# Patient Record
Sex: FEMALE | Race: WHITE | URBAN - METROPOLITAN AREA
[De-identification: names, ages, dates, MRNs, and addresses within clinical notes are randomized per-mention and may not be internally consistent; named-entity substitution may affect disease eponyms.]

---

## 2018-08-10 ENCOUNTER — EMERGENCY (EMERGENCY)
Facility: HOSPITAL | Age: 43
LOS: 0 days | Discharge: HOME | End: 2018-08-10
Attending: STUDENT IN AN ORGANIZED HEALTH CARE EDUCATION/TRAINING PROGRAM | Admitting: STUDENT IN AN ORGANIZED HEALTH CARE EDUCATION/TRAINING PROGRAM

## 2018-08-10 VITALS
RESPIRATION RATE: 18 BRPM | HEART RATE: 87 BPM | DIASTOLIC BLOOD PRESSURE: 58 MMHG | OXYGEN SATURATION: 100 % | TEMPERATURE: 98 F | SYSTOLIC BLOOD PRESSURE: 104 MMHG

## 2018-08-10 VITALS — HEIGHT: 67 IN | WEIGHT: 259.04 LBS

## 2018-08-10 DIAGNOSIS — R79.9 ABNORMAL FINDING OF BLOOD CHEMISTRY, UNSPECIFIED: ICD-10-CM

## 2018-08-10 DIAGNOSIS — E03.9 HYPOTHYROIDISM, UNSPECIFIED: ICD-10-CM

## 2018-08-10 DIAGNOSIS — D64.9 ANEMIA, UNSPECIFIED: ICD-10-CM

## 2018-08-10 DIAGNOSIS — J45.909 UNSPECIFIED ASTHMA, UNCOMPLICATED: ICD-10-CM

## 2018-08-10 DIAGNOSIS — F17.200 NICOTINE DEPENDENCE, UNSPECIFIED, UNCOMPLICATED: ICD-10-CM

## 2018-08-10 DIAGNOSIS — R07.9 CHEST PAIN, UNSPECIFIED: ICD-10-CM

## 2018-08-10 LAB
ALBUMIN SERPL ELPH-MCNC: 3.9 G/DL — SIGNIFICANT CHANGE UP (ref 3.5–5.2)
ALP SERPL-CCNC: 54 U/L — SIGNIFICANT CHANGE UP (ref 30–115)
ALT FLD-CCNC: 10 U/L — SIGNIFICANT CHANGE UP (ref 0–41)
ANION GAP SERPL CALC-SCNC: 11 MMOL/L — SIGNIFICANT CHANGE UP (ref 7–14)
APTT BLD: 33.1 SEC — SIGNIFICANT CHANGE UP (ref 27–39.2)
AST SERPL-CCNC: 12 U/L — SIGNIFICANT CHANGE UP (ref 0–41)
BASOPHILS # BLD AUTO: 0.02 K/UL — SIGNIFICANT CHANGE UP (ref 0–0.2)
BASOPHILS NFR BLD AUTO: 0.6 % — SIGNIFICANT CHANGE UP (ref 0–1)
BILIRUB SERPL-MCNC: <0.2 MG/DL — SIGNIFICANT CHANGE UP (ref 0.2–1.2)
BUN SERPL-MCNC: 16 MG/DL — SIGNIFICANT CHANGE UP (ref 10–20)
CALCIUM SERPL-MCNC: 8.8 MG/DL — SIGNIFICANT CHANGE UP (ref 8.5–10.1)
CHLORIDE SERPL-SCNC: 106 MMOL/L — SIGNIFICANT CHANGE UP (ref 98–110)
CK SERPL-CCNC: 48 U/L — SIGNIFICANT CHANGE UP (ref 0–225)
CO2 SERPL-SCNC: 23 MMOL/L — SIGNIFICANT CHANGE UP (ref 17–32)
CREAT SERPL-MCNC: 0.6 MG/DL — LOW (ref 0.7–1.5)
D DIMER BLD IA.RAPID-MCNC: 112 NG/ML DDU — SIGNIFICANT CHANGE UP (ref 0–230)
EOSINOPHIL # BLD AUTO: 0.08 K/UL — SIGNIFICANT CHANGE UP (ref 0–0.7)
EOSINOPHIL NFR BLD AUTO: 2.2 % — SIGNIFICANT CHANGE UP (ref 0–8)
GLUCOSE SERPL-MCNC: 92 MG/DL — SIGNIFICANT CHANGE UP (ref 70–99)
HCT VFR BLD CALC: 25.6 % — LOW (ref 37–47)
HGB BLD-MCNC: 7.2 G/DL — CRITICAL LOW (ref 12–16)
IMM GRANULOCYTES NFR BLD AUTO: 0.6 % — HIGH (ref 0.1–0.3)
INR BLD: 1.04 RATIO — SIGNIFICANT CHANGE UP (ref 0.65–1.3)
LYMPHOCYTES # BLD AUTO: 0.74 K/UL — LOW (ref 1.2–3.4)
LYMPHOCYTES # BLD AUTO: 20.6 % — SIGNIFICANT CHANGE UP (ref 20.5–51.1)
MCHC RBC-ENTMCNC: 19 PG — LOW (ref 27–31)
MCHC RBC-ENTMCNC: 28.1 G/DL — LOW (ref 32–37)
MCV RBC AUTO: 67.5 FL — LOW (ref 81–99)
MONOCYTES # BLD AUTO: 0.2 K/UL — SIGNIFICANT CHANGE UP (ref 0.1–0.6)
MONOCYTES NFR BLD AUTO: 5.6 % — SIGNIFICANT CHANGE UP (ref 1.7–9.3)
NEUTROPHILS # BLD AUTO: 2.54 K/UL — SIGNIFICANT CHANGE UP (ref 1.4–6.5)
NEUTROPHILS NFR BLD AUTO: 70.4 % — SIGNIFICANT CHANGE UP (ref 42.2–75.2)
NRBC # BLD: 0 /100 WBCS — SIGNIFICANT CHANGE UP (ref 0–0)
PLATELET # BLD AUTO: 214 K/UL — SIGNIFICANT CHANGE UP (ref 130–400)
POTASSIUM SERPL-MCNC: 4.7 MMOL/L — SIGNIFICANT CHANGE UP (ref 3.5–5)
POTASSIUM SERPL-SCNC: 4.7 MMOL/L — SIGNIFICANT CHANGE UP (ref 3.5–5)
PROT SERPL-MCNC: 6.3 G/DL — SIGNIFICANT CHANGE UP (ref 6–8)
PROTHROM AB SERPL-ACNC: 11.2 SEC — SIGNIFICANT CHANGE UP (ref 9.95–12.87)
RBC # BLD: 3.79 M/UL — LOW (ref 4.2–5.4)
RBC # FLD: 18.1 % — HIGH (ref 11.5–14.5)
SODIUM SERPL-SCNC: 140 MMOL/L — SIGNIFICANT CHANGE UP (ref 135–146)
TROPONIN T SERPL-MCNC: <0.01 NG/ML — SIGNIFICANT CHANGE UP
TROPONIN T SERPL-MCNC: <0.01 NG/ML — SIGNIFICANT CHANGE UP
WBC # BLD: 3.6 K/UL — LOW (ref 4.8–10.8)
WBC # FLD AUTO: 3.6 K/UL — LOW (ref 4.8–10.8)

## 2018-08-10 RX ORDER — SODIUM CHLORIDE 9 MG/ML
3 INJECTION INTRAMUSCULAR; INTRAVENOUS; SUBCUTANEOUS ONCE
Qty: 0 | Refills: 0 | Status: COMPLETED | OUTPATIENT
Start: 2018-08-10 | End: 2018-08-10

## 2018-08-10 RX ADMIN — SODIUM CHLORIDE 3 MILLILITER(S): 9 INJECTION INTRAMUSCULAR; INTRAVENOUS; SUBCUTANEOUS at 09:44

## 2018-08-10 NOTE — ED PROVIDER NOTE - NS ED ROS FT
Constitutional:  See HPI  Eyes:  No visual changes  ENMT: No neck pain or stiffness  Cardiac:  See HPI  Respiratory:  No cough or respiratory distress.   GI:  No nausea, vomiting, diarrhea or abdominal pain; no bloody or black stool  :  No dysuria, frequency or burning or hematuria  MS:  No back pain.  Neuro:  No headache or dizziness  Skin:  No skin rash  Except as documented in the HPI,  all other systems are negative

## 2018-08-10 NOTE — ED PROVIDER NOTE - PHYSICAL EXAMINATION
CONSTITUTIONAL: NAD  SKIN: Warm dry  HEAD: NCAT  EYES: NL inspection  ENT: MMM  NECK: Supple; non tender.  CARD: RRR  RESP: CTAB  ABD: S/NT no R/G  EXT: no pedal edema  NEURO: Grossly unremarkable  PSYCH: Cooperative, appropriate.

## 2018-08-10 NOTE — ED PROVIDER NOTE - MEDICAL DECISION MAKING DETAILS
IMP: pe vs acs vs MSK vs anemia.  Pt overall low risk for cardiovasc disease.  P: labs, ekg, cxr, reassess.

## 2018-08-10 NOTE — ED ADULT NURSE NOTE - NSIMPLEMENTINTERV_GEN_ALL_ED
Implemented All Universal Safety Interventions:  [place] to call system. Call bell, personal items and telephone within reach. Instruct patient to call for assistance. Room bathroom lighting operational. Non-slip footwear when patient is off stretcher. Physically safe environment: no spills, clutter or unnecessary equipment. Stretcher in lowest position, wheels locked, appropriate side rails in place.

## 2018-08-10 NOTE — ED PROVIDER NOTE - OBJECTIVE STATEMENT
41 y/o F pmh asthma, thyroid d/o, smoker, p/w CP.  Pt has had similar intermittent pain x 1 yr.  yesterday, worse, and lasted all day, still now.  [place] [facility] non [place]chemci ekg and NL cxr, labs w/ low H/H.  called back this AM by them for elevated ddimer.  No sob, travel, leg swelling, OCP.  NO ablnl bleeding.  No exertional sx. NO hx VTE or sig FHX.  No prior stress/ cardio eval.  pain now mild, non radiating.  + smoker

## 2018-08-10 NOTE — ED ADULT NURSE NOTE - OBJECTIVE STATEMENT
Patient states she has been having chest pain since yesterday, went to urgent care where they micheal blood, states she received call from urgent care stating that her d-dimer was elevated and hemoglobin was low. Per patient, she has history of anemia, was sent to hospital for CT of chest to r/o PE.

## 2018-09-29 NOTE — ED PROVIDER NOTE - NS ED MD DISPO DISCHARGE CCDA
Patient/Caregiver provided printed discharge information. Pt rec'd in 28, Ukrainian-speaking with son translating per request. Per son, pt went walking in the park yesterday, was coming out of the car and felt dizzy and nauseated. Pt felt fine last night and went to sleep, woke up this morning with 3 episode of vomiting, dizziness, left sided headache, and chest pressure. Denies abd pain. Per son, pt took Zantac last night and has "lots of gas". Pt came from [facility] 3 weeks ago and was seen here 1 week ago for SOB.

## 2020-01-01 ENCOUNTER — EMERGENCY (EMERGENCY)
Facility: HOSPITAL | Age: 45
LOS: 0 days | Discharge: HOME | End: 2020-01-01
Attending: EMERGENCY MEDICINE | Admitting: EMERGENCY MEDICINE
Payer: COMMERCIAL

## 2020-01-01 VITALS
RESPIRATION RATE: 18 BRPM | SYSTOLIC BLOOD PRESSURE: 126 MMHG | HEIGHT: 67 IN | OXYGEN SATURATION: 100 % | TEMPERATURE: 98 F | DIASTOLIC BLOOD PRESSURE: 65 MMHG | WEIGHT: 225.97 LBS | HEART RATE: 62 BPM

## 2020-01-01 DIAGNOSIS — S60.221A CONTUSION OF RIGHT HAND, INITIAL ENCOUNTER: ICD-10-CM
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
## 2020-01-01 DIAGNOSIS — S62.612A DISPLACED FRACTURE OF PROXIMAL PHALANX OF RIGHT MIDDLE FINGER, INITIAL ENCOUNTER FOR CLOSED FRACTURE: ICD-10-CM
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
## 2020-01-01 PROCEDURE — 73130 X-RAY EXAM OF HAND: CPT | Mod: 26,RT

## 2020-01-01 PROCEDURE — 26720 TREAT FINGER FRACTURE EACH: CPT | Mod: 54

## 2020-01-01 PROCEDURE — 99284 EMERGENCY DEPT VISIT MOD MDM: CPT | Mod: 57

## 2020-01-01 NOTE — ED PROVIDER NOTE - NSFOLLOWUPINSTRUCTIONS_ED_ALL_ED_FT
right 3rd proximal phalanx  fracture    A fracture is a break in one of your bones. This can occur from a variety of injuries especially traumatic ones. Symptoms include pain, bruising, or swelling.  A splint might have been applied by your health care provider. Make sure to keep it dry and follow up with an orthopedist as instructed.    SEEK IMMEDIATE MEDICAL CARE IF YOU HAVE THE FOLLOWING SYMPTOMS: numbness, tingling, pain, or weakness in any part of your body distal to the fracture.

## 2020-01-01 NOTE — ED PROVIDER NOTE - CARE PROVIDER_API CALL
Eddi Dutta)  Orthopaedic Surgery  [address] [place] [address]  Phone: [phone]  Fax: [phone]  Follow Up Time:

## 2020-01-01 NOTE — ED PROVIDER NOTE - NS ED ROS FT
Constitutional: no fever, chills, no recent weight loss, change in appetite or malaise  MS: see HPI  Neuro: No headache or weakness. No LOC.  Skin: + bruising  Endocrine: No history of thyroid disease or diabetes.

## 2020-01-01 NOTE — ED PROVIDER NOTE - PHYSICAL EXAMINATION
CONSTITUTIONAL: Well-appearing; well-nourished; in no apparent distress.   MS: + ttp to right 3th MCP with painful ROM. non-tender to right 2nd/4th MCP  joints and fingers.   SKIN: + ecchymosis/swelling and ttp over the right 2nd to 4th MCP joint dorsal aspect.   NEURO/PSYCH: A & O x 4; grossly unremarkable.

## 2020-01-01 NOTE — ED PROVIDER NOTE - CLINICAL SUMMARY MEDICAL DECISION MAKING FREE TEXT BOX
44yF p/w R hand injury - xray concerning for subtle fx of 3rd prox phalanx near MCP, minimally displaced - pt splinted.  Ok to dc with splint care instructions, ortho referral, return precautions.

## 2020-01-01 NOTE — ED PROVIDER NOTE - OBJECTIVE STATEMENT
45 yo female no sig hx present c/o right hand pain/bruising and swelling s/p a door slummed on her hand a day ago. pain worsen with movement and palpation. pain mostly to the third finger. denies numbness and weakness to right hand. denies other injury.

## 2020-01-01 NOTE — ED PROVIDER NOTE - PATIENT PORTAL LINK FT
You can access the FollowMyHealth Patient Portal offered by [facility] by registering at the following website: [email or web address]. By joining [employer]’s FollowMyHealth portal, you will also be able to view your health information using other applications (apps) compatible with our system.

## 2020-01-01 NOTE — ED PROVIDER NOTE - ATTENDING CONTRIBUTION TO CARE
44yF p/w R hand pain - accidentally had door slammed on her R hand - c/o pain, bruising to R hand.  Not on blood thinners.    exam w/ tenderness to dorsum of R hand just prox to 2nd/3rd MCP joints, +ecchymosis and mild swelling

## 2020-01-02 PROBLEM — E03.9 HYPOTHYROIDISM, UNSPECIFIED: Chronic | Status: ACTIVE | Noted: 2018-08-10

## 2021-10-10 ENCOUNTER — EMERGENCY (EMERGENCY)
Facility: HOSPITAL | Age: 46
LOS: 0 days | Discharge: HOME | End: 2021-10-11
Attending: STUDENT IN AN ORGANIZED HEALTH CARE EDUCATION/TRAINING PROGRAM | Admitting: STUDENT IN AN ORGANIZED HEALTH CARE EDUCATION/TRAINING PROGRAM
Payer: COMMERCIAL

## 2021-10-10 VITALS
DIASTOLIC BLOOD PRESSURE: 80 MMHG | RESPIRATION RATE: 18 BRPM | HEART RATE: 116 BPM | WEIGHT: 212.08 LBS | SYSTOLIC BLOOD PRESSURE: 135 MMHG | OXYGEN SATURATION: 99 % | TEMPERATURE: 98 F | HEIGHT: 67 IN

## 2021-10-10 DIAGNOSIS — R06.02 SHORTNESS OF BREATH: ICD-10-CM

## 2021-10-10 DIAGNOSIS — Z20.822 CONTACT WITH AND (SUSPECTED) EXPOSURE TO COVID-19: ICD-10-CM

## 2021-10-10 DIAGNOSIS — Z87.891 PERSONAL HISTORY OF NICOTINE DEPENDENCE: ICD-10-CM

## 2021-10-10 PROCEDURE — 99285 EMERGENCY DEPT VISIT HI MDM: CPT

## 2021-10-10 NOTE — ED ADULT TRIAGE NOTE - CHIEF COMPLAINT QUOTE
Patient complaining of shortness of breath [id number] and worsening. Patient reports mild lightheadedness.

## 2021-10-11 VITALS
RESPIRATION RATE: 17 BRPM | DIASTOLIC BLOOD PRESSURE: 60 MMHG | TEMPERATURE: 97 F | SYSTOLIC BLOOD PRESSURE: 109 MMHG | OXYGEN SATURATION: 100 % | HEART RATE: 95 BPM

## 2021-10-11 LAB
ALBUMIN SERPL ELPH-MCNC: 4.2 G/DL — SIGNIFICANT CHANGE UP (ref 3.5–5.2)
ALP SERPL-CCNC: 68 U/L — SIGNIFICANT CHANGE UP (ref 30–115)
ALT FLD-CCNC: 19 U/L — SIGNIFICANT CHANGE UP (ref 0–41)
ANION GAP SERPL CALC-SCNC: 16 MMOL/L — HIGH (ref 7–14)
APTT BLD: 35.1 SEC — SIGNIFICANT CHANGE UP (ref 27–39.2)
AST SERPL-CCNC: 14 U/L — SIGNIFICANT CHANGE UP (ref 0–41)
BASE EXCESS BLDV CALC-SCNC: -6.1 MMOL/L — LOW (ref -2–3)
BASOPHILS # BLD AUTO: 0.02 K/UL — SIGNIFICANT CHANGE UP (ref 0–0.2)
BASOPHILS NFR BLD AUTO: 0.4 % — SIGNIFICANT CHANGE UP (ref 0–1)
BILIRUB SERPL-MCNC: <0.2 MG/DL — SIGNIFICANT CHANGE UP (ref 0.2–1.2)
BUN SERPL-MCNC: 11 MG/DL — SIGNIFICANT CHANGE UP (ref 10–20)
CA-I SERPL-SCNC: 1.18 MMOL/L — SIGNIFICANT CHANGE UP (ref 1.15–1.33)
CALCIUM SERPL-MCNC: 8.8 MG/DL — SIGNIFICANT CHANGE UP (ref 8.5–10.1)
CHLORIDE SERPL-SCNC: 109 MMOL/L — SIGNIFICANT CHANGE UP (ref 98–110)
CO2 SERPL-SCNC: 17 MMOL/L — SIGNIFICANT CHANGE UP (ref 17–32)
CREAT SERPL-MCNC: 0.7 MG/DL — SIGNIFICANT CHANGE UP (ref 0.7–1.5)
EOSINOPHIL # BLD AUTO: 0.11 K/UL — SIGNIFICANT CHANGE UP (ref 0–0.7)
EOSINOPHIL NFR BLD AUTO: 2.3 % — SIGNIFICANT CHANGE UP (ref 0–8)
GAS PNL BLDV: 139 MMOL/L — SIGNIFICANT CHANGE UP (ref 136–145)
GAS PNL BLDV: SIGNIFICANT CHANGE UP
GLUCOSE SERPL-MCNC: 92 MG/DL — SIGNIFICANT CHANGE UP (ref 70–99)
HCG SERPL QL: NEGATIVE — SIGNIFICANT CHANGE UP
HCO3 BLDV-SCNC: 19 MMOL/L — LOW (ref 22–29)
HCT VFR BLD CALC: 32.7 % — LOW (ref 37–47)
HCT VFR BLDA CALC: 32 % — LOW (ref 34.5–46.5)
HGB BLD CALC-MCNC: 10.5 G/DL — LOW (ref 11.7–16.1)
HGB BLD-MCNC: 10.8 G/DL — LOW (ref 12–16)
IMM GRANULOCYTES NFR BLD AUTO: 0.4 % — HIGH (ref 0.1–0.3)
INR BLD: 0.97 RATIO — SIGNIFICANT CHANGE UP (ref 0.65–1.3)
LACTATE BLDV-MCNC: 2 MMOL/L — SIGNIFICANT CHANGE UP (ref 0.5–2)
LYMPHOCYTES # BLD AUTO: 1.07 K/UL — LOW (ref 1.2–3.4)
LYMPHOCYTES # BLD AUTO: 22.1 % — SIGNIFICANT CHANGE UP (ref 20.5–51.1)
MAGNESIUM SERPL-MCNC: 1.8 MG/DL — SIGNIFICANT CHANGE UP (ref 1.8–2.4)
MCHC RBC-ENTMCNC: 28.4 PG — SIGNIFICANT CHANGE UP (ref 27–31)
MCHC RBC-ENTMCNC: 33 G/DL — SIGNIFICANT CHANGE UP (ref 32–37)
MCV RBC AUTO: 86.1 FL — SIGNIFICANT CHANGE UP (ref 81–99)
MONOCYTES # BLD AUTO: 0.28 K/UL — SIGNIFICANT CHANGE UP (ref 0.1–0.6)
MONOCYTES NFR BLD AUTO: 5.8 % — SIGNIFICANT CHANGE UP (ref 1.7–9.3)
NEUTROPHILS # BLD AUTO: 3.34 K/UL — SIGNIFICANT CHANGE UP (ref 1.4–6.5)
NEUTROPHILS NFR BLD AUTO: 69 % — SIGNIFICANT CHANGE UP (ref 42.2–75.2)
NRBC # BLD: 0 /100 WBCS — SIGNIFICANT CHANGE UP (ref 0–0)
NT-PROBNP SERPL-SCNC: 63 PG/ML — SIGNIFICANT CHANGE UP (ref 0–300)
PCO2 BLDV: 33 MMHG — LOW (ref 39–42)
PH BLDV: 7.36 — SIGNIFICANT CHANGE UP (ref 7.32–7.43)
PLATELET # BLD AUTO: 182 K/UL — SIGNIFICANT CHANGE UP (ref 130–400)
PO2 BLDV: 55 MMHG — SIGNIFICANT CHANGE UP
POTASSIUM BLDV-SCNC: 3.5 MMOL/L — SIGNIFICANT CHANGE UP (ref 3.5–5.1)
POTASSIUM SERPL-MCNC: 3.8 MMOL/L — SIGNIFICANT CHANGE UP (ref 3.5–5)
POTASSIUM SERPL-SCNC: 3.8 MMOL/L — SIGNIFICANT CHANGE UP (ref 3.5–5)
PROT SERPL-MCNC: 6.5 G/DL — SIGNIFICANT CHANGE UP (ref 6–8)
PROTHROM AB SERPL-ACNC: 11.2 SEC — SIGNIFICANT CHANGE UP (ref 9.95–12.87)
RBC # BLD: 3.8 M/UL — LOW (ref 4.2–5.4)
RBC # FLD: 12.9 % — SIGNIFICANT CHANGE UP (ref 11.5–14.5)
SAO2 % BLDV: 88.5 % — SIGNIFICANT CHANGE UP
SARS-COV-2 RNA SPEC QL NAA+PROBE: SIGNIFICANT CHANGE UP
SODIUM SERPL-SCNC: 142 MMOL/L — SIGNIFICANT CHANGE UP (ref 135–146)
TROPONIN T SERPL-MCNC: <0.01 NG/ML — SIGNIFICANT CHANGE UP
WBC # BLD: 4.84 K/UL — SIGNIFICANT CHANGE UP (ref 4.8–10.8)
WBC # FLD AUTO: 4.84 K/UL — SIGNIFICANT CHANGE UP (ref 4.8–10.8)

## 2021-10-11 PROCEDURE — 93010 ELECTROCARDIOGRAM REPORT: CPT

## 2021-10-11 PROCEDURE — 71275 CT ANGIOGRAPHY CHEST: CPT | Mod: 26,MA

## 2021-10-11 PROCEDURE — 71045 X-RAY EXAM CHEST 1 VIEW: CPT | Mod: 26

## 2021-10-11 NOTE — ED ADULT NURSE NOTE - OBJECTIVE STATEMENT
Patient complaining of shortness of breath [id number] and worsening. Patient reports mild lightheadedness.

## 2021-10-11 NOTE — ED PROVIDER NOTE - CARE PROVIDER_API CALL
Ricardo Fuentes)  Critical Care Medicine; Internal Medicine; Pulmonary Disease; Sleep Medicine  [address] [place] [address]  Phone: [phone]  Fax: [phone]  Follow Up Time: 1-3 Days    Frank Feliciano  CARDIOVASCULAR DISEASE  [address] [place] [address]  Phone: [phone]  Fax: [phone]  Follow Up Time: 1-3 Days

## 2021-10-11 NOTE — ED PROVIDER NOTE - PROVIDER TOKENS
PROVIDER:[TOKEN:[[id number]],FOLLOWUP:[1-3 Days]],PROVIDER:[TOKEN:[[id number]],FOLLOWUP:[1-3 Days]]

## 2021-10-11 NOTE — ED ADULT NURSE NOTE - CHIEF COMPLAINT QUOTE
Patient complaining of shortness of breath [id number] and worsening. Patient reports mild lightheadedness.

## 2021-10-11 NOTE — ED ADULT NURSE NOTE - NSFALLRSKINDICATORS_ED_ALL_ED
How Severe Are Your Spot(S)?: mild What Is The Reason For Today's Visit?: Full Body Skin Examination What Is The Reason For Today's Visit? (Being Monitored For X): the development of new lesions no

## 2021-10-11 NOTE — ED PROVIDER NOTE - CLINICAL SUMMARY MEDICAL DECISION MAKING FREE TEXT BOX
I personally evaluated the patient. I reviewed the Resident’s or Physician Assistant’s note (as assigned above), and agree with the findings and plan except as documented in my note. Patient evaluated for sob. Labs, ekg, CXR, CTA chest performed in the ED. Pt well appearing, vs stable. Given pum and cardio follow up. I have fully discussed the medical management and delivery of care with the patient. I have discussed any available labs, imaging and treatment options with the patient. Patient confirms understanding and has been given detailed return precautions. Patient instructed to return to the ED should symptoms persist or worsen. Patient has demonstrated capacity and has verbalized understanding. Patient is well appearing upon discharge.

## 2021-10-11 NOTE — ED ADULT NURSE NOTE - NSIMPLEMENTINTERV_GEN_ALL_ED
Implemented All Fall with Harm Risk Interventions:  [place] to call system. Call bell, personal items and telephone within reach. Instruct patient to call for assistance. Room bathroom lighting operational. Non-slip footwear when patient is off stretcher. Physically safe environment: no spills, clutter or unnecessary equipment. Stretcher in lowest position, wheels locked, appropriate side rails in place. Provide visual cue, wrist band, yellow gown, etc. Monitor gait and stability. Monitor for mental status changes and reorient to person, place, and time. Review medications for side effects contributing to fall risk. Reinforce activity limits and safety measures with patient and family. Provide visual clues: red socks.

## 2021-10-11 NOTE — ED PROVIDER NOTE - PATIENT PORTAL LINK FT
You can access the FollowMyHealth Patient Portal offered by [facility] by registering at the following website: [email or web address]. By joining [employer]’s FollowMyHealth portal, you will also be able to view your health information using other applications (apps) compatible with our system.

## 2021-10-11 NOTE — ED PROVIDER NOTE - ATTENDING CONTRIBUTION TO CARE
44 yo F no pmh pw sob. SOB for the past 3 weeks. Worse on exertion, no associated sx. NO f/c, no n/v/d, no cp, no palpitations, no cough, no back pain, no leg swelling, no recent travel, no ocp use, no urinary sx, no recent surgery/immobilization, no hx pe/dvt.     CONSTITUTIONAL: Well-developed; well-nourished; in no acute distress. Sitting up and providing appropriate history and physical examination  SKIN: skin exam is warm and dry, no acute rash.  HEAD: Normocephalic; atraumatic.  EYES: PERRL, 3 mm bilateral, no nystagmus, EOM intact; conjunctiva and sclera clear.  ENT: No nasal discharge; airway clear.  NECK: Supple; non tender. + full passive ROM in all directions. No JVD  CARD: S1, S2 normal; no murmurs, gallops, or rubs. Regular rate and rhythm. + Symmetric Strong Pulses  RESP: No wheezes, rales or rhonchi. Good air movement bilaterally  ABD: soft; non-distended; non-tender. No Rebound, No Guarding, No signs of peritonitis, No CVA tenderness. No pulsatile abdominal mass. + Strong and Symmetric Pulses  EXT: Normal ROM. No clubbing, cyanosis or edema. Dp and Pt Pulses intact. Cap refill less than 3 seconds  NEURO: CN 2-12 intact, normal finger to nose, normal romberg, stable gait, no sensory or motor deficits, Alert, oriented, grossly unremarkable. No Focal deficits. GCS 15. NIH 0  PSYCH: Cooperative, appropriate.

## 2021-10-11 NOTE — ED PROVIDER NOTE - PROGRESS NOTE DETAILS
Pt reports improvement in symptoms. Discussed results and provided copy to pt. Pt understands to follow-up with PMD/Pulm/cardio. Pt understands to return to ED if symptoms return/worsen. Agreeable to discharge.

## 2021-10-11 NOTE — ED PROVIDER NOTE - OBJECTIVE STATEMENT
44 yo F with no significant PMHx presents to the ED c/o persisting SOB x 3 weeks. Pt feels symptoms while at rest as well and finds it difficult to take a deep breath. Pt denies hx of similar symptoms in the past. Symptoms are worse with exertion. She states she received J&J vaccine one month ago. She denies other complaints. Pt denies fever, chills, nausea, vomiting, abdominal pain, diarrhea, headache, dizziness, weakness, chest pain, back pain, LOC, trauma, urinary symptoms, cough, calf pain/swelling, recent travel, recent surgery, OCP use.

## 2021-10-11 NOTE — ED PROVIDER NOTE - NS ED ROS FT
Review of Systems  Constitutional:  No fever, chills.  Eyes:  No visual changes, eye pain, or discharge.  ENMT:  No hearing changes, pain, or discharge. No nasal congestion, discharge, or bleeding. No throat pain, swelling, or difficulty swallowing.  Cardiac:  No chest pain, palpitations, syncope, or edema.  Respiratory:  No cough. No hemoptysis. (+) SOB  GI:  No nausea, vomiting, diarrhea, or abdominal pain.   :  No dysuria, hematuria, frequency, or burning.   MS:  No back pain.  Skin:  No skin rash, pruritis, jaundice, or lesions.  Neuro:  No headache, dizziness, loss of sensation, or focal weakness.  No change in mental status.   Endocrine: No history of thyroid disease or diabetes.

## 2021-10-14 ENCOUNTER — APPOINTMENT (OUTPATIENT)
Age: 46
End: 2021-10-14
Payer: COMMERCIAL

## 2021-10-14 VITALS
HEART RATE: 113 BPM | RESPIRATION RATE: 14 BRPM | DIASTOLIC BLOOD PRESSURE: 80 MMHG | SYSTOLIC BLOOD PRESSURE: 122 MMHG | OXYGEN SATURATION: 99 %

## 2021-10-14 DIAGNOSIS — Z80.1 FAMILY HISTORY OF MALIGNANT NEOPLASM OF TRACHEA, BRONCHUS AND LUNG: ICD-10-CM

## 2021-10-14 DIAGNOSIS — Z87.09 PERSONAL HISTORY OF OTHER DISEASES OF THE RESPIRATORY SYSTEM: ICD-10-CM

## 2021-10-14 PROCEDURE — 99203 OFFICE O/P NEW LOW 30 MIN: CPT

## 2021-10-14 NOTE — DISCUSSION/SUMMARY
[FreeTextEntry1] : etio for sob-ed w/u neg,doesn’t respond to albuterol/////mild anemia,needs thyroid studies checked,pfts,pf monitoring//////cardio eval-dr ta-s/p neg echo per pt///////denies anxiety or stress factors/////does not desat with vigorous walking////////denies efrain symps///////d/c vaping

## 2021-10-14 NOTE — HISTORY OF PRESENT ILLNESS
[Former] : former [Current] : current [TextBox_4] : seen ined-10/11/21-sob x 3 wks-no cough,wheezing or cp///////cxr-neg,ct for pe-neg

## 2021-10-31 ENCOUNTER — EMERGENCY (EMERGENCY)
Facility: HOSPITAL | Age: 46
LOS: 0 days | Discharge: HOME | End: 2021-11-01
Attending: STUDENT IN AN ORGANIZED HEALTH CARE EDUCATION/TRAINING PROGRAM | Admitting: STUDENT IN AN ORGANIZED HEALTH CARE EDUCATION/TRAINING PROGRAM
Payer: COMMERCIAL

## 2021-10-31 VITALS
SYSTOLIC BLOOD PRESSURE: 129 MMHG | HEIGHT: 67 IN | OXYGEN SATURATION: 98 % | WEIGHT: 214.95 LBS | HEART RATE: 88 BPM | DIASTOLIC BLOOD PRESSURE: 80 MMHG | RESPIRATION RATE: 18 BRPM

## 2021-10-31 DIAGNOSIS — K80.20 CALCULUS OF GALLBLADDER WITHOUT CHOLECYSTITIS WITHOUT OBSTRUCTION: ICD-10-CM

## 2021-10-31 DIAGNOSIS — R11.0 NAUSEA: ICD-10-CM

## 2021-10-31 DIAGNOSIS — J45.909 UNSPECIFIED ASTHMA, UNCOMPLICATED: ICD-10-CM

## 2021-10-31 DIAGNOSIS — R10.13 EPIGASTRIC PAIN: ICD-10-CM

## 2021-10-31 DIAGNOSIS — R10.11 RIGHT UPPER QUADRANT PAIN: ICD-10-CM

## 2021-10-31 DIAGNOSIS — E03.9 HYPOTHYROIDISM, UNSPECIFIED: ICD-10-CM

## 2021-10-31 LAB
ALBUMIN SERPL ELPH-MCNC: 4.1 G/DL — SIGNIFICANT CHANGE UP (ref 3.5–5.2)
ALP SERPL-CCNC: 70 U/L — SIGNIFICANT CHANGE UP (ref 30–115)
ALT FLD-CCNC: 18 U/L — SIGNIFICANT CHANGE UP (ref 0–41)
ANION GAP SERPL CALC-SCNC: 12 MMOL/L — SIGNIFICANT CHANGE UP (ref 7–14)
AST SERPL-CCNC: 23 U/L — SIGNIFICANT CHANGE UP (ref 0–41)
BASOPHILS # BLD AUTO: 0.02 K/UL — SIGNIFICANT CHANGE UP (ref 0–0.2)
BASOPHILS NFR BLD AUTO: 0.5 % — SIGNIFICANT CHANGE UP (ref 0–1)
BILIRUB SERPL-MCNC: <0.2 MG/DL — SIGNIFICANT CHANGE UP (ref 0.2–1.2)
BUN SERPL-MCNC: 18 MG/DL — SIGNIFICANT CHANGE UP (ref 10–20)
CALCIUM SERPL-MCNC: 9 MG/DL — SIGNIFICANT CHANGE UP (ref 8.5–10.1)
CHLORIDE SERPL-SCNC: 107 MMOL/L — SIGNIFICANT CHANGE UP (ref 98–110)
CO2 SERPL-SCNC: 21 MMOL/L — SIGNIFICANT CHANGE UP (ref 17–32)
CREAT SERPL-MCNC: 0.7 MG/DL — SIGNIFICANT CHANGE UP (ref 0.7–1.5)
EOSINOPHIL # BLD AUTO: 0.1 K/UL — SIGNIFICANT CHANGE UP (ref 0–0.7)
EOSINOPHIL NFR BLD AUTO: 2.3 % — SIGNIFICANT CHANGE UP (ref 0–8)
GLUCOSE SERPL-MCNC: 111 MG/DL — HIGH (ref 70–99)
HCG SERPL QL: NEGATIVE — SIGNIFICANT CHANGE UP
HCT VFR BLD CALC: 31 % — LOW (ref 37–47)
HGB BLD-MCNC: 10.1 G/DL — LOW (ref 12–16)
IMM GRANULOCYTES NFR BLD AUTO: 0.5 % — HIGH (ref 0.1–0.3)
LACTATE SERPL-SCNC: 2.1 MMOL/L — HIGH (ref 0.7–2)
LIDOCAIN IGE QN: 31 U/L — SIGNIFICANT CHANGE UP (ref 7–60)
LYMPHOCYTES # BLD AUTO: 1.08 K/UL — LOW (ref 1.2–3.4)
LYMPHOCYTES # BLD AUTO: 24.3 % — SIGNIFICANT CHANGE UP (ref 20.5–51.1)
MCHC RBC-ENTMCNC: 27.8 PG — SIGNIFICANT CHANGE UP (ref 27–31)
MCHC RBC-ENTMCNC: 32.6 G/DL — SIGNIFICANT CHANGE UP (ref 32–37)
MCV RBC AUTO: 85.4 FL — SIGNIFICANT CHANGE UP (ref 81–99)
MONOCYTES # BLD AUTO: 0.29 K/UL — SIGNIFICANT CHANGE UP (ref 0.1–0.6)
MONOCYTES NFR BLD AUTO: 6.5 % — SIGNIFICANT CHANGE UP (ref 1.7–9.3)
NEUTROPHILS # BLD AUTO: 2.93 K/UL — SIGNIFICANT CHANGE UP (ref 1.4–6.5)
NEUTROPHILS NFR BLD AUTO: 65.9 % — SIGNIFICANT CHANGE UP (ref 42.2–75.2)
NRBC # BLD: 0 /100 WBCS — SIGNIFICANT CHANGE UP (ref 0–0)
PLATELET # BLD AUTO: 204 K/UL — SIGNIFICANT CHANGE UP (ref 130–400)
POTASSIUM SERPL-MCNC: 4.1 MMOL/L — SIGNIFICANT CHANGE UP (ref 3.5–5)
POTASSIUM SERPL-SCNC: 4.1 MMOL/L — SIGNIFICANT CHANGE UP (ref 3.5–5)
PROT SERPL-MCNC: 6.5 G/DL — SIGNIFICANT CHANGE UP (ref 6–8)
RBC # BLD: 3.63 M/UL — LOW (ref 4.2–5.4)
RBC # FLD: 12.6 % — SIGNIFICANT CHANGE UP (ref 11.5–14.5)
SODIUM SERPL-SCNC: 140 MMOL/L — SIGNIFICANT CHANGE UP (ref 135–146)
WBC # BLD: 4.44 K/UL — LOW (ref 4.8–10.8)
WBC # FLD AUTO: 4.44 K/UL — LOW (ref 4.8–10.8)

## 2021-10-31 PROCEDURE — 71045 X-RAY EXAM CHEST 1 VIEW: CPT | Mod: 26

## 2021-10-31 PROCEDURE — 93010 ELECTROCARDIOGRAM REPORT: CPT

## 2021-10-31 PROCEDURE — 76705 ECHO EXAM OF ABDOMEN: CPT | Mod: 26

## 2021-10-31 PROCEDURE — 99285 EMERGENCY DEPT VISIT HI MDM: CPT

## 2021-10-31 RX ORDER — ONDANSETRON 8 MG/1
4 TABLET, FILM COATED ORAL ONCE
Refills: 0 | Status: COMPLETED | OUTPATIENT
Start: 2021-10-31 | End: 2021-10-31

## 2021-10-31 RX ORDER — MORPHINE SULFATE 50 MG/1
4 CAPSULE, EXTENDED RELEASE ORAL ONCE
Refills: 0 | Status: DISCONTINUED | OUTPATIENT
Start: 2021-10-31 | End: 2021-10-31

## 2021-10-31 RX ORDER — SODIUM CHLORIDE 9 MG/ML
1000 INJECTION, SOLUTION INTRAVENOUS ONCE
Refills: 0 | Status: COMPLETED | OUTPATIENT
Start: 2021-10-31 | End: 2021-10-31

## 2021-10-31 RX ORDER — FAMOTIDINE 10 MG/ML
20 INJECTION INTRAVENOUS ONCE
Refills: 0 | Status: COMPLETED | OUTPATIENT
Start: 2021-10-31 | End: 2021-10-31

## 2021-10-31 RX ADMIN — SODIUM CHLORIDE 1000 MILLILITER(S): 9 INJECTION, SOLUTION INTRAVENOUS at 22:22

## 2021-10-31 RX ADMIN — ONDANSETRON 4 MILLIGRAM(S): 8 TABLET, FILM COATED ORAL at 22:21

## 2021-10-31 RX ADMIN — FAMOTIDINE 20 MILLIGRAM(S): 10 INJECTION INTRAVENOUS at 22:22

## 2021-10-31 RX ADMIN — MORPHINE SULFATE 4 MILLIGRAM(S): 50 CAPSULE, EXTENDED RELEASE ORAL at 22:20

## 2021-10-31 NOTE — ED ADULT NURSE NOTE - OBJECTIVE STATEMENT
pt presented to ED c/o nonradiating  epigastric pain today.  Pain worsens with movement and when palpated. Pain worsened s/p eating. had one episode of bright red bloody stool.  Airway patent. No urinary/bowl incontinence. Pt denies cp sob fever.

## 2021-11-01 NOTE — ED PROVIDER NOTE - CLINICAL SUMMARY MEDICAL DECISION MAKING FREE TEXT BOX
Patient presented with epigastric pain worse with food, + Diaz's sign on exam. Labs and imaging reviewed. No leukocytosis, US shows gallstones without evidence of cholecystitis. On reassessment pt reports improvement of sx, abd soft nt. Patient given diet modification education, f/u with surgery OP. I have fully discussed the medical management and delivery of care with the patient. I have discussed any available labs, imaging and treatment options with the patient. All Questions answered at the bedside and printed copies of all results provided and recommended to review with PCP. Patient confirms understanding and has been given detailed return precautions. Patient instructed to return to the ED should symptoms persist or worsen. Patient has demonstrated capacity and has verbalized understanding. Patient is well appearing upon discharge, ambulatory with a steady gait.

## 2021-11-01 NOTE — ED PROVIDER NOTE - NSFOLLOWUPCLINICS_GEN_ALL_ED_FT
[facility]  Surgery  [address] [place] [address]  Phone: [phone]  Fax:   Follow Up Time: 1-3 Days

## 2021-11-01 NOTE — ED PROVIDER NOTE - PATIENT PORTAL LINK FT
You can access the FollowMyHealth Patient Portal offered by [facility] by registering at the following website: [email or web address]. By joining [employer]’s FollowMyHealth portal, you will also be able to view your health information using other applications (apps) compatible with our system.

## 2021-11-01 NOTE — ED PROVIDER NOTE - ATTENDING CONTRIBUTION TO CARE
45F presents to [facility] for upper abdominal pain worse with food, associated with nausea. She has never been told that she has gallstones; denies SOB, diarrhea, fevers, chills, flank pain, urinary sx, bloody or melanotic stool.     Gen - NAD, Head - NCAT, Pharynx - clear, MMM, Heart - RRR, no m/g/r, Lungs - CTAB, no w/c/r, Abdomen - soft, + Diaz's sign, ND, Skin - No rash, Extremities - FROM, no edema, erythema, ecchymosis, brisk cap refill, Neuro - A&O x3, equal strength and sensation, non-focal exam    a/p:  RUQ pain  labs, RUQ US, GI cocktail, reassess

## 2021-11-01 NOTE — ED PROVIDER NOTE - OBJECTIVE STATEMENT
45 yold female Pmhx hypothyroidism, asthma c/o mild epigastric and Right side abdominal pain x 1 day after meal; pt with nausea no vomiting, fever, chills, back pain; pt denies issues with food in past; pt admits to eating fatty/greasy foods today;

## 2021-11-01 NOTE — ED PROVIDER NOTE - NSFOLLOWUPINSTRUCTIONS_ED_ALL_ED_FT
Cholelithiasis    Cholelithiasis is a form of gallbladder disease in which gallstones form in the gallbladder. The gallbladder is an organ that stores bile. Bile is made in the liver, and it helps to digest fats. Gallstones begin as small crystals and slowly grow into stones. They may cause no symptoms until the gallbladder tightens (contracts) and a gallstone is blocking the duct (gallbladder attack), which can cause pain. Cholelithiasis is also referred to as gallstones.    ImageThere are two main types of gallstones:    Cholesterol stones. These are made of hardened cholesterol and are usually yellow-green in color. They are the most common type of gallstone. Cholesterol is a white, waxy, fat-like substance that is made in the liver.  Pigment stones. These are dark in color and are made of a red-yellow substance that forms when hemoglobin from red blood cells breaks down (bilirubin).    What are the causes?  This condition may be caused by an imbalance in the substances that bile is made of. This can happen if the bile:    Has too much bilirubin.  Has too much cholesterol.  Does not have enough bile salts. These salts help the body absorb and digest fats.    In some cases, this condition can also be caused by the gallbladder not emptying completely or often enough.    What increases the risk?  The following factors may make you more likely to develop this condition:    Being female.  Having multiple pregnancies. Health care providers sometimes advise removing diseased gallbladders before future pregnancies.  Eating a diet that is heavy in fried foods, fat, and refined carbohydrates, like white bread and white rice.  Being obese.  Being older than age 40.  Prolonged use of medicines that contain female hormones (estrogen).  Having diabetes mellitus.  Rapidly losing weight.  Having a family history of gallstones.  Being of  or Somali descent.  Having an intestinal disease such as Crohn disease.  Having metabolic syndrome.  Having cirrhosis.  Having severe types of anemia such as sickle cell anemia.    What are the signs or symptoms?  In most cases, there are no symptoms. These are known as silent gallstones. If a gallstone blocks the bile ducts, it can cause a gallbladder attack. The main symptom of a gallbladder attack is sudden pain in the upper right abdomen. The pain usually comes at night or after eating a large meal. The pain can last for one or several hours and can spread to the right shoulder or chest.    If the bile duct is blocked for more than a few hours, it can cause infection or inflammation of the gallbladder, liver, or pancreas, which may cause:    Nausea.  Vomiting.  Abdominal pain that lasts for 5 hours or more.  Fever or chills.  Yellowing of the skin or the whites of the eyes (jaundice).  Dark urine.  Light-colored stools.    How is this diagnosed?  This condition may be diagnosed based on:    A physical exam.  Your medical history.  An ultrasound of your gallbladder.  CT scan.  MRI.  Blood tests to check for signs of infection or inflammation.  A scan of your gallbladder and bile ducts (biliary system) using nonharmful radioactive material and special cameras that can see the radioactive material (cholescintigram). This test checks to see how your gallbladder contracts and whether bile ducts are blocked.  Inserting a small tube with a camera on the end (endoscope) through your mouth to inspect bile ducts and check for blockages (endoscopic retrograde cholangiopancreatogram).    How is this treated?  Treatment for gallstones depends on the severity of the condition. Silent gallstones do not need treatment. If the gallstones cause a gallbladder attack or other symptoms, treatment may be required. Options for treatment include:    Surgery to remove the gallbladder (cholecystectomy). This is the most common treatment.  Medicines to dissolve gallstones. These are most effective at treating small gallstones. You may need to take medicines for up to 6–12 months.  Shock wave treatment (extracorporeal biliary lithotripsy). In this treatment, an ultrasound machine sends shock waves to the gallbladder to break gallstones into smaller pieces. These pieces can then be passed into the intestines or be dissolved by medicine. This is rarely used.  Removing gallstones through endoscopic retrograde cholangiopancreatogram. A small basket can be attached to the endoscope and used to capture and remove gallstones.    Follow these instructions at home:  Take over-the-counter and prescription medicines only as told by your health care provider.  Maintain a healthy weight and follow a healthy diet. This includes:    Reducing fatty foods, such as fried food.  Reducing refined carbohydrates, like white bread and white rice.  Increasing fiber. Aim for foods like almonds, fruit, and beans.    Keep all follow-up visits as told by your health care provider. This is important.  Contact a health care provider if:  You think you have had a gallbladder attack.  You have been diagnosed with silent gallstones and you develop abdominal pain or indigestion.  Get help right away if:  You have pain from a gallbladder attack that lasts for more than 2 hours.  You have abdominal pain that lasts for more than 5 hours.  You have a fever or chills.  You have persistent nausea and vomiting.  You develop jaundice.  You have dark urine or light-colored stools.  Summary  Cholelithiasis (also called gallstones) is a form of gallbladder disease in which gallstones form in the gallbladder.  This condition is caused by an imbalance in the substances that make up bile. This can happen if the bile has too much cholesterol, too much bilirubin, or not enough bile salts.  You are more likely to develop this condition if you are female, pregnant, using medicines with estrogen, obese, older than age 40, or have a family history of gallstones. You may also develop gallstones if you have diabetes, an intestinal disease, cirrhosis, or metabolic syndrome.  Treatment for gallstones depends on the severity of the condition. Silent gallstones do not need treatment.  If gallstones cause a gallbladder attack or other symptoms, treatment may be needed. The most common treatment is surgery to remove the gallbladder.  This information is not intended to replace advice given to you by your health care provider. Make sure you discuss any questions you have with your health care provider.

## 2021-11-01 NOTE — ED PROVIDER NOTE - NS ED ROS FT
Constitutional: No fever, chills.  Eyes: No visual changes.  ENT: No hearing changes.  Neck: No neck pain or stiffness.  Cardiovascular: No chest pain, palpitations, edema.  Pulmonary: No SOB, cough. No hemoptysis.  Abdominal:  see hpi.  : No dysuria, frequency.  Neuro: No headache, syncope, dizziness.  MS: No back pain. No calf pain/swelling.  Psych: No suicidal ideations.

## 2021-11-01 NOTE — ED PROVIDER NOTE - PHYSICAL EXAMINATION
Constitutional: Well developed, well nourished. NAD  Head: Normocephalic, atraumatic.  Eyes: PERRL, EOMI.  ENT: No nasal discharge. Mucous membranes dry.  Neck: Supple. Painless ROM.  Cardiovascular: Normal S1, S2. Regular rate and rhythm.    Pulmonary: Normal respiratory rate and effort. Lungs clear to auscultation bilaterally.    Abdominal: Soft mild tenderness RUQ/epigastric area; no rebound, guarding or flank pain  Extremities. Pelvis stable. No lower extremity edema, symmetric calves.  Skin: No rashes, cyanosis.  Neuro: AAOx3. No focal neurological deficits.  Psych: Normal mood. Normal affect.

## 2021-12-06 ENCOUNTER — OUTPATIENT (OUTPATIENT)
Dept: OUTPATIENT SERVICES | Facility: HOSPITAL | Age: 46
LOS: 1 days | Discharge: HOME | End: 2021-12-06

## 2021-12-06 VITALS
OXYGEN SATURATION: 99 % | HEIGHT: 67 IN | WEIGHT: 216.93 LBS | DIASTOLIC BLOOD PRESSURE: 60 MMHG | HEART RATE: 76 BPM | RESPIRATION RATE: 16 BRPM | TEMPERATURE: 98 F | SYSTOLIC BLOOD PRESSURE: 102 MMHG

## 2021-12-06 DIAGNOSIS — Z01.818 ENCOUNTER FOR OTHER PREPROCEDURAL EXAMINATION: ICD-10-CM

## 2021-12-06 DIAGNOSIS — Z98.51 TUBAL LIGATION STATUS: Chronic | ICD-10-CM

## 2021-12-06 DIAGNOSIS — K80.20 CALCULUS OF GALLBLADDER WITHOUT CHOLECYSTITIS WITHOUT OBSTRUCTION: ICD-10-CM

## 2021-12-06 LAB
ALBUMIN SERPL ELPH-MCNC: 4.5 G/DL — SIGNIFICANT CHANGE UP (ref 3.5–5.2)
ALP SERPL-CCNC: 72 U/L — SIGNIFICANT CHANGE UP (ref 30–115)
ALT FLD-CCNC: 13 U/L — SIGNIFICANT CHANGE UP (ref 0–41)
AST SERPL-CCNC: 15 U/L — SIGNIFICANT CHANGE UP (ref 0–41)
BASOPHILS # BLD AUTO: 0.03 K/UL — SIGNIFICANT CHANGE UP (ref 0–0.2)
BASOPHILS NFR BLD AUTO: 0.7 % — SIGNIFICANT CHANGE UP (ref 0–1)
BILIRUB DIRECT SERPL-MCNC: <0.2 MG/DL — SIGNIFICANT CHANGE UP (ref 0–0.3)
BILIRUB INDIRECT FLD-MCNC: SIGNIFICANT CHANGE UP MG/DL (ref 0.2–1.2)
BILIRUB SERPL-MCNC: <0.2 MG/DL — SIGNIFICANT CHANGE UP (ref 0.2–1.2)
EOSINOPHIL # BLD AUTO: 0.1 K/UL — SIGNIFICANT CHANGE UP (ref 0–0.7)
EOSINOPHIL NFR BLD AUTO: 2.2 % — SIGNIFICANT CHANGE UP (ref 0–8)
HCT VFR BLD CALC: 35.3 % — LOW (ref 37–47)
HGB BLD-MCNC: 10.9 G/DL — LOW (ref 12–16)
IMM GRANULOCYTES NFR BLD AUTO: 0.4 % — HIGH (ref 0.1–0.3)
LYMPHOCYTES # BLD AUTO: 1.33 K/UL — SIGNIFICANT CHANGE UP (ref 1.2–3.4)
LYMPHOCYTES # BLD AUTO: 29 % — SIGNIFICANT CHANGE UP (ref 20.5–51.1)
MCHC RBC-ENTMCNC: 26.1 PG — LOW (ref 27–31)
MCHC RBC-ENTMCNC: 30.9 G/DL — LOW (ref 32–37)
MCV RBC AUTO: 84.4 FL — SIGNIFICANT CHANGE UP (ref 81–99)
MONOCYTES # BLD AUTO: 0.27 K/UL — SIGNIFICANT CHANGE UP (ref 0.1–0.6)
MONOCYTES NFR BLD AUTO: 5.9 % — SIGNIFICANT CHANGE UP (ref 1.7–9.3)
NEUTROPHILS # BLD AUTO: 2.83 K/UL — SIGNIFICANT CHANGE UP (ref 1.4–6.5)
NEUTROPHILS NFR BLD AUTO: 61.8 % — SIGNIFICANT CHANGE UP (ref 42.2–75.2)
NRBC # BLD: 0 /100 WBCS — SIGNIFICANT CHANGE UP (ref 0–0)
PLATELET # BLD AUTO: 211 K/UL — SIGNIFICANT CHANGE UP (ref 130–400)
PROT SERPL-MCNC: 7.1 G/DL — SIGNIFICANT CHANGE UP (ref 6–8)
RBC # BLD: 4.18 M/UL — LOW (ref 4.2–5.4)
RBC # FLD: 13.2 % — SIGNIFICANT CHANGE UP (ref 11.5–14.5)
WBC # BLD: 4.58 K/UL — LOW (ref 4.8–10.8)
WBC # FLD AUTO: 4.58 K/UL — LOW (ref 4.8–10.8)

## 2021-12-06 NOTE — H&P PST ADULT - ATTENDING COMMENTS
I met with patient  reviewed consent  r/b/a explained to patient again  all questions answered  patient aware of risks including, but not exclusive of, infection, bleeding, neurovascular damage, numbness, keloid scar, dvt, pe pt aware of open surgery

## 2021-12-06 NOTE — H&P PST ADULT - NSICDXPASTMEDICALHX_GEN_ALL_CORE_FT
PAST MEDICAL HISTORY:  2019 novel coronavirus disease (COVID-19) 4/2020    Anemia iron infusion 3/2021  NALITT    Asthma well controlled -albuterol -less than once a week    GERD (gastroesophageal reflux disease)     Hypothyroid     Obesity

## 2021-12-06 NOTE — H&P PST ADULT - HISTORY OF PRESENT ILLNESS
45 yr old female states had RT UQ  pain 10/31-ER visit -wk up revealed gallstones "still  pain on and off depending on what I eat" is scheduled for Laparoscopic Cholecystectomy possible open. Denies COVID S/S. Recd vaccine. Verbalized understanding of COVID prevention measures. Exercise jj 2 FOS.  Anesthesia Alert  NO--Difficult Airway  NO--History of neck surgery or radiation  NO--Limited ROM of neck  NO--History of Malignant hyperthermia  NO--Personal or family history of Pseudocholinesterase deficiency  NO--Prior Anesthesia Complication  NO--Latex Allergy  NO--Loose teeth  NO--History of Rheumatoid Arthritis  NO--GINO  No Bleeding risk  NO--Other_____

## 2021-12-06 NOTE — H&P PST ADULT - NSANTHOSAYNRD_GEN_A_CORE
No. GINO screening performed.  STOP BANG Legend: 0-2 = LOW Risk; 3-4 = INTERMEDIATE Risk; 5-8 = HIGH Risk

## 2021-12-07 ENCOUNTER — APPOINTMENT (OUTPATIENT)
Age: 46
End: 2021-12-07

## 2021-12-09 NOTE — ASU PATIENT PROFILE, ADULT - FALL HARM RISK - UNIVERSAL INTERVENTIONS
Bed in lowest position, wheels locked, appropriate side rails in place/Call bell, personal items and telephone in reach/Instruct patient to call for assistance before getting out of bed or chair/Non-slip footwear when patient is out of bed/[place] to call system/Physically safe environment - no spills, clutter or unnecessary equipment/Purposeful Proactive Rounding/Room/bathroom lighting operational, light cord in reach

## 2021-12-10 ENCOUNTER — RESULT REVIEW (OUTPATIENT)
Age: 46
End: 2021-12-10

## 2021-12-10 ENCOUNTER — OUTPATIENT (OUTPATIENT)
Dept: OUTPATIENT SERVICES | Facility: HOSPITAL | Age: 46
LOS: 1 days | Discharge: HOME | End: 2021-12-10
Payer: COMMERCIAL

## 2021-12-10 VITALS
HEART RATE: 88 BPM | TEMPERATURE: 98 F | SYSTOLIC BLOOD PRESSURE: 94 MMHG | RESPIRATION RATE: 18 BRPM | DIASTOLIC BLOOD PRESSURE: 51 MMHG | HEIGHT: 67 IN | WEIGHT: 216.93 LBS | OXYGEN SATURATION: 99 %

## 2021-12-10 VITALS
HEART RATE: 62 BPM | OXYGEN SATURATION: 100 % | RESPIRATION RATE: 18 BRPM | DIASTOLIC BLOOD PRESSURE: 66 MMHG | SYSTOLIC BLOOD PRESSURE: 103 MMHG

## 2021-12-10 DIAGNOSIS — Z98.51 TUBAL LIGATION STATUS: Chronic | ICD-10-CM

## 2021-12-10 PROCEDURE — 88304 TISSUE EXAM BY PATHOLOGIST: CPT | Mod: 26

## 2021-12-10 RX ORDER — PHENTERMINE AND TOPIRAMATE 3.75; 23 MG/1; MG/1
1 CAPSULE, EXTENDED RELEASE ORAL
Qty: 0 | Refills: 0 | DISCHARGE

## 2021-12-10 RX ORDER — LEVOTHYROXINE SODIUM 125 MCG
1 TABLET ORAL
Qty: 0 | Refills: 0 | DISCHARGE

## 2021-12-10 RX ORDER — SODIUM CHLORIDE 9 MG/ML
1000 INJECTION, SOLUTION INTRAVENOUS
Refills: 0 | Status: DISCONTINUED | OUTPATIENT
Start: 2021-12-10 | End: 2021-12-24

## 2021-12-10 RX ORDER — METOCLOPRAMIDE HCL 10 MG
10 TABLET ORAL ONCE
Refills: 0 | Status: COMPLETED | OUTPATIENT
Start: 2021-12-10 | End: 2021-12-10

## 2021-12-10 RX ORDER — ESOMEPRAZOLE MAGNESIUM 40 MG/1
1 CAPSULE, DELAYED RELEASE ORAL
Qty: 0 | Refills: 0 | DISCHARGE

## 2021-12-10 RX ORDER — HYDROMORPHONE HYDROCHLORIDE 2 MG/ML
0.5 INJECTION INTRAMUSCULAR; INTRAVENOUS; SUBCUTANEOUS
Refills: 0 | Status: DISCONTINUED | OUTPATIENT
Start: 2021-12-10 | End: 2021-12-10

## 2021-12-10 RX ADMIN — HYDROMORPHONE HYDROCHLORIDE 0.5 MILLIGRAM(S): 2 INJECTION INTRAMUSCULAR; INTRAVENOUS; SUBCUTANEOUS at 09:18

## 2021-12-10 RX ADMIN — Medication 10 MILLIGRAM(S): at 09:37

## 2021-12-10 RX ADMIN — HYDROMORPHONE HYDROCHLORIDE 0.5 MILLIGRAM(S): 2 INJECTION INTRAMUSCULAR; INTRAVENOUS; SUBCUTANEOUS at 09:29

## 2021-12-10 RX ADMIN — HYDROMORPHONE HYDROCHLORIDE 0.5 MILLIGRAM(S): 2 INJECTION INTRAMUSCULAR; INTRAVENOUS; SUBCUTANEOUS at 09:28

## 2021-12-10 NOTE — ASU DISCHARGE PLAN (ADULT/PEDIATRIC) - CARE PROVIDER_API CALL
Julia Mendes)  Surgery  [address] [place] [address]  Phone: [phone]  Fax: [phone]  Established Patient  Scheduled Appointment: 12/21/2021

## 2021-12-10 NOTE — ASU DISCHARGE PLAN (ADULT/PEDIATRIC) - NS MD DC FALL RISK RISK
For information on Fall & Injury Prevention, visit: [email or web address] OR  [email or web address] OR  https://www.cdc.gov/steadi/patient.html

## 2021-12-10 NOTE — CHART NOTE - NSCHARTNOTEFT_GEN_A_CORE
PACU ANESTHESIA ADMISSION NOTE      Procedure:   Post op diagnosis:      ____  Intubated  TV:______       Rate: ______      FiO2: ______    __x__  Patent Airway    __x__  Full return of protective reflexes    __x__  Full recovery from anesthesia / back to baseline status    Vitals:  T(C): 36.8 (12-10-21 @ 07:15), Max: 36.8 (12-10-21 @ 06:37)  HR: 88 (12-10-21 @ 07:15) (88 - 88)  BP: 94/51 (12-10-21 @ 07:15) (94/51 - 94/51)  RR: 18 (12-10-21 @ 07:15) (18 - 18)  SpO2: 99% (12-10-21 @ 07:15) (99% - 99%)    Mental Status:  __x__ Awake   ___x__ Alert   _____ Drowsy   _____ Sedated    Nausea/Vomiting:  __x__ NO  ______Yes,   See Post - Op Orders          Pain Scale (0-10):  _____    Treatment: ____ None    __x__ See Post - Op/PCA Orders    Post - Operative Fluids:   ____ Oral   __x__ See Post - Op Orders    Plan: Discharge:   ____Home       _____Floor     _____Critical Care    _____  Other:_________________    Comments: Patient had smooth intraoperative event, no anesthesia complication.  PACU Vital signs: HR:   85         BP:  111      /  72        RR:  16           O2 Sat:  98     %     Temp 97F

## 2021-12-13 LAB — SURGICAL PATHOLOGY STUDY: SIGNIFICANT CHANGE UP

## 2021-12-15 DIAGNOSIS — K80.10 CALCULUS OF GALLBLADDER WITH CHRONIC CHOLECYSTITIS WITHOUT OBSTRUCTION: ICD-10-CM

## 2021-12-15 DIAGNOSIS — D64.9 ANEMIA, UNSPECIFIED: ICD-10-CM

## 2021-12-15 DIAGNOSIS — E66.9 OBESITY, UNSPECIFIED: ICD-10-CM

## 2021-12-15 DIAGNOSIS — J45.909 UNSPECIFIED ASTHMA, UNCOMPLICATED: ICD-10-CM

## 2021-12-15 DIAGNOSIS — Z86.19 PERSONAL HISTORY OF OTHER INFECTIOUS AND PARASITIC DISEASES: ICD-10-CM

## 2021-12-15 DIAGNOSIS — Z87.891 PERSONAL HISTORY OF NICOTINE DEPENDENCE: ICD-10-CM

## 2022-08-13 NOTE — ED PROVIDER NOTE - NS ED MD EM SELECTION
72-year-old female with history of SVT, bronchiectasis presents to the emergency department complaint shortness of breath and palpitations. She has chronic lung disease but felt that she may have been in SVT this morning with palpitations and shortness of breath. No chest pain or pressure. No fever. No cough. The history is provided by the patient and medical records. Palpitations   This is a recurrent problem. The current episode started 3 to 5 hours ago. The problem has been gradually worsening. The problem occurs constantly. Associated symptoms include shortness of breath. Pertinent negatives include no fever, no chest pain, no chest pressure, no irregular heartbeat, no abdominal pain, no nausea, no vomiting, no headaches, no weakness and no cough. Her past medical history is significant for SVT. No past medical history on file. No past surgical history on file. No family history on file. Social History     Socioeconomic History    Marital status:      Spouse name: Not on file    Number of children: Not on file    Years of education: Not on file    Highest education level: Not on file   Occupational History    Not on file   Tobacco Use    Smoking status: Not on file    Smokeless tobacco: Not on file   Substance and Sexual Activity    Alcohol use: Not on file    Drug use: Not on file    Sexual activity: Not on file   Other Topics Concern    Not on file   Social History Narrative    Not on file     Social Determinants of Health     Financial Resource Strain: Not on file   Food Insecurity: Not on file   Transportation Needs: Not on file   Physical Activity: Not on file   Stress: Not on file   Social Connections: Not on file   Intimate Partner Violence: Not on file   Housing Stability: Not on file         ALLERGIES: Sulfa (sulfonamide antibiotics)    Review of Systems   Constitutional:  Negative for fatigue and fever. HENT:  Negative for sneezing and sore throat. Respiratory:  Positive for shortness of breath. Negative for cough. Cardiovascular:  Positive for palpitations. Negative for chest pain and leg swelling. Gastrointestinal:  Negative for abdominal pain, diarrhea, nausea and vomiting. Genitourinary:  Negative for difficulty urinating and dysuria. Musculoskeletal:  Negative for arthralgias and myalgias. Skin:  Negative for color change and rash. Neurological:  Negative for weakness and headaches. Psychiatric/Behavioral:  Negative for agitation and behavioral problems. Vitals:    08/13/22 0612   Temp: 97.6 °F (36.4 °C)            Physical Exam  Vitals and nursing note reviewed. Constitutional:       General: She is not in acute distress. Appearance: She is well-developed. She is ill-appearing. She is not toxic-appearing or diaphoretic. HENT:      Head: Normocephalic and atraumatic. Nose: Nose normal.      Mouth/Throat:      Mouth: Mucous membranes are moist.      Pharynx: Oropharynx is clear. Eyes:      Extraocular Movements: Extraocular movements intact. Conjunctiva/sclera: Conjunctivae normal.      Pupils: Pupils are equal, round, and reactive to light. Cardiovascular:      Rate and Rhythm: Normal rate and regular rhythm. Pulses: Normal pulses. Heart sounds: Normal heart sounds. Pulmonary:      Effort: Pulmonary effort is normal. No respiratory distress. Breath sounds: Wheezing (Faint expiratory) present. Chest:      Chest wall: No tenderness. Abdominal:      General: Abdomen is flat. There is no distension. Palpations: Abdomen is soft. Tenderness: There is no abdominal tenderness. There is no guarding or rebound. Musculoskeletal:         General: No swelling, tenderness, deformity or signs of injury. Normal range of motion. Cervical back: Normal range of motion and neck supple. No rigidity. No muscular tenderness. Right lower leg: No edema. Left lower leg: No edema.    Skin: General: Skin is warm and dry. Capillary Refill: Capillary refill takes less than 2 seconds. Neurological:      General: No focal deficit present. Mental Status: She is alert and oriented to person, place, and time. Psychiatric:         Mood and Affect: Mood normal.         Behavior: Behavior normal.        MDM     Amount and/or Complexity of Data Reviewed  Clinical lab tests: reviewed  Tests in the radiology section of CPT®: reviewed  Tests in the medicine section of CPT®: reviewed      ED Course as of 08/13/22 1852   Sat Aug 13, 2022   0619   ED EKG interpretation:  Rhythm: normal sinus rhythm. Rate (approx.): 71. Right bundle branch block. ST segment:  No concerning ST elevations or depressions. This EKG was interpreted by Katharine March MD,ED Provider. [JM]   4279 7:06 AM  Change of shift. Care of patient taken over from Dr. Fara Shrestha; H&P reviewed, bedside handoff complete. Awaiting results of CBC which were unremarkable. Pt resting comfortably with stable vital signs. Stable for discharge home. [JM-2]      ED Course User Index  [JM] Dion Johnson MD  [JM-2] Chucho Miner MD       Procedures             6:52 AM  Change of shift. Care of patient signed over to Dr Noy Carney. Handoff complete. [id number] Comprehensive

## 2023-03-04 ENCOUNTER — EMERGENCY (EMERGENCY)
Facility: HOSPITAL | Age: 48
LOS: 0 days | Discharge: HOME | End: 2023-03-04
Attending: EMERGENCY MEDICINE
Payer: COMMERCIAL

## 2023-03-04 VITALS
RESPIRATION RATE: 19 BRPM | OXYGEN SATURATION: 99 % | DIASTOLIC BLOOD PRESSURE: 69 MMHG | HEIGHT: 67 IN | HEART RATE: 78 BPM | WEIGHT: 218.04 LBS | SYSTOLIC BLOOD PRESSURE: 106 MMHG | TEMPERATURE: 98 F

## 2023-03-04 VITALS
DIASTOLIC BLOOD PRESSURE: 71 MMHG | HEART RATE: 66 BPM | OXYGEN SATURATION: 100 % | SYSTOLIC BLOOD PRESSURE: 113 MMHG | TEMPERATURE: 98 F | RESPIRATION RATE: 19 BRPM

## 2023-03-04 DIAGNOSIS — E03.9 HYPOTHYROIDISM, UNSPECIFIED: ICD-10-CM

## 2023-03-04 DIAGNOSIS — R39.15 URGENCY OF URINATION: ICD-10-CM

## 2023-03-04 DIAGNOSIS — J45.909 UNSPECIFIED ASTHMA, UNCOMPLICATED: ICD-10-CM

## 2023-03-04 DIAGNOSIS — R10.9 UNSPECIFIED ABDOMINAL PAIN: ICD-10-CM

## 2023-03-04 DIAGNOSIS — R11.0 NAUSEA: ICD-10-CM

## 2023-03-04 DIAGNOSIS — R35.0 FREQUENCY OF MICTURITION: ICD-10-CM

## 2023-03-04 DIAGNOSIS — Z90.49 ACQUIRED ABSENCE OF OTHER SPECIFIED PARTS OF DIGESTIVE TRACT: ICD-10-CM

## 2023-03-04 DIAGNOSIS — Z98.51 TUBAL LIGATION STATUS: Chronic | ICD-10-CM

## 2023-03-04 DIAGNOSIS — R30.0 DYSURIA: ICD-10-CM

## 2023-03-04 DIAGNOSIS — Z88.1 ALLERGY STATUS TO OTHER ANTIBIOTIC AGENTS STATUS: ICD-10-CM

## 2023-03-04 DIAGNOSIS — M54.50 LOW BACK PAIN, UNSPECIFIED: ICD-10-CM

## 2023-03-04 PROBLEM — U07.1 COVID-19: Chronic | Status: ACTIVE | Noted: 2021-12-06

## 2023-03-04 PROBLEM — D64.9 ANEMIA, UNSPECIFIED: Chronic | Status: ACTIVE | Noted: 2021-12-06

## 2023-03-04 PROBLEM — K21.9 GASTRO-ESOPHAGEAL REFLUX DISEASE WITHOUT ESOPHAGITIS: Chronic | Status: ACTIVE | Noted: 2021-12-06

## 2023-03-04 PROBLEM — E66.9 OBESITY, UNSPECIFIED: Chronic | Status: ACTIVE | Noted: 2021-12-06

## 2023-03-04 LAB
ALBUMIN SERPL ELPH-MCNC: 4 G/DL — SIGNIFICANT CHANGE UP (ref 3.5–5.2)
ALP SERPL-CCNC: 64 U/L — SIGNIFICANT CHANGE UP (ref 30–115)
ALT FLD-CCNC: 12 U/L — SIGNIFICANT CHANGE UP (ref 0–41)
ANION GAP SERPL CALC-SCNC: 10 MMOL/L — SIGNIFICANT CHANGE UP (ref 7–14)
APPEARANCE UR: CLEAR — SIGNIFICANT CHANGE UP
AST SERPL-CCNC: 10 U/L — SIGNIFICANT CHANGE UP (ref 0–41)
BASOPHILS # BLD AUTO: 0.03 K/UL — SIGNIFICANT CHANGE UP (ref 0–0.2)
BASOPHILS NFR BLD AUTO: 0.6 % — SIGNIFICANT CHANGE UP (ref 0–1)
BILIRUB SERPL-MCNC: 0.3 MG/DL — SIGNIFICANT CHANGE UP (ref 0.2–1.2)
BILIRUB UR-MCNC: NEGATIVE — SIGNIFICANT CHANGE UP
BUN SERPL-MCNC: 14 MG/DL — SIGNIFICANT CHANGE UP (ref 10–20)
CALCIUM SERPL-MCNC: 9.4 MG/DL — SIGNIFICANT CHANGE UP (ref 8.4–10.5)
CHLORIDE SERPL-SCNC: 111 MMOL/L — HIGH (ref 98–110)
CO2 SERPL-SCNC: 21 MMOL/L — SIGNIFICANT CHANGE UP (ref 17–32)
COLOR SPEC: SIGNIFICANT CHANGE UP
CREAT SERPL-MCNC: 0.8 MG/DL — SIGNIFICANT CHANGE UP (ref 0.7–1.5)
DIFF PNL FLD: NEGATIVE — SIGNIFICANT CHANGE UP
EGFR: 93 ML/MIN/1.73M2 — SIGNIFICANT CHANGE UP
EOSINOPHIL # BLD AUTO: 0.08 K/UL — SIGNIFICANT CHANGE UP (ref 0–0.7)
EOSINOPHIL NFR BLD AUTO: 1.7 % — SIGNIFICANT CHANGE UP (ref 0–8)
GLUCOSE SERPL-MCNC: 98 MG/DL — SIGNIFICANT CHANGE UP (ref 70–99)
GLUCOSE UR QL: NEGATIVE — SIGNIFICANT CHANGE UP
HCG SERPL QL: NEGATIVE — SIGNIFICANT CHANGE UP
HCT VFR BLD CALC: 37.2 % — SIGNIFICANT CHANGE UP (ref 37–47)
HGB BLD-MCNC: 12.3 G/DL — SIGNIFICANT CHANGE UP (ref 12–16)
IMM GRANULOCYTES NFR BLD AUTO: 0.6 % — HIGH (ref 0.1–0.3)
KETONES UR-MCNC: NEGATIVE — SIGNIFICANT CHANGE UP
LEUKOCYTE ESTERASE UR-ACNC: NEGATIVE — SIGNIFICANT CHANGE UP
LYMPHOCYTES # BLD AUTO: 0.97 K/UL — LOW (ref 1.2–3.4)
LYMPHOCYTES # BLD AUTO: 20.6 % — SIGNIFICANT CHANGE UP (ref 20.5–51.1)
MCHC RBC-ENTMCNC: 29.5 PG — SIGNIFICANT CHANGE UP (ref 27–31)
MCHC RBC-ENTMCNC: 33.1 G/DL — SIGNIFICANT CHANGE UP (ref 32–37)
MCV RBC AUTO: 89.2 FL — SIGNIFICANT CHANGE UP (ref 81–99)
MONOCYTES # BLD AUTO: 0.22 K/UL — SIGNIFICANT CHANGE UP (ref 0.1–0.6)
MONOCYTES NFR BLD AUTO: 4.7 % — SIGNIFICANT CHANGE UP (ref 1.7–9.3)
NEUTROPHILS # BLD AUTO: 3.38 K/UL — SIGNIFICANT CHANGE UP (ref 1.4–6.5)
NEUTROPHILS NFR BLD AUTO: 71.8 % — SIGNIFICANT CHANGE UP (ref 42.2–75.2)
NITRITE UR-MCNC: NEGATIVE — SIGNIFICANT CHANGE UP
NRBC # BLD: 0 /100 WBCS — SIGNIFICANT CHANGE UP (ref 0–0)
PH UR: 6 — SIGNIFICANT CHANGE UP (ref 5–8)
PLATELET # BLD AUTO: 184 K/UL — SIGNIFICANT CHANGE UP (ref 130–400)
POTASSIUM SERPL-MCNC: 5.3 MMOL/L — HIGH (ref 3.5–5)
POTASSIUM SERPL-SCNC: 5.3 MMOL/L — HIGH (ref 3.5–5)
PROT SERPL-MCNC: 6.7 G/DL — SIGNIFICANT CHANGE UP (ref 6–8)
PROT UR-MCNC: NEGATIVE — SIGNIFICANT CHANGE UP
RBC # BLD: 4.17 M/UL — LOW (ref 4.2–5.4)
RBC # FLD: 13.3 % — SIGNIFICANT CHANGE UP (ref 11.5–14.5)
SODIUM SERPL-SCNC: 142 MMOL/L — SIGNIFICANT CHANGE UP (ref 135–146)
SP GR SPEC: 1.02 — SIGNIFICANT CHANGE UP (ref 1.01–1.03)
UROBILINOGEN FLD QL: SIGNIFICANT CHANGE UP
WBC # BLD: 4.71 K/UL — LOW (ref 4.8–10.8)
WBC # FLD AUTO: 4.71 K/UL — LOW (ref 4.8–10.8)

## 2023-03-04 PROCEDURE — 99285 EMERGENCY DEPT VISIT HI MDM: CPT | Mod: 25

## 2023-03-04 PROCEDURE — 93010 ELECTROCARDIOGRAM REPORT: CPT

## 2023-03-04 PROCEDURE — 96374 THER/PROPH/DIAG INJ IV PUSH: CPT | Mod: XU

## 2023-03-04 PROCEDURE — 93005 ELECTROCARDIOGRAM TRACING: CPT

## 2023-03-04 PROCEDURE — 84703 CHORIONIC GONADOTROPIN ASSAY: CPT

## 2023-03-04 PROCEDURE — 74177 CT ABD & PELVIS W/CONTRAST: CPT | Mod: 26,MA

## 2023-03-04 PROCEDURE — 74177 CT ABD & PELVIS W/CONTRAST: CPT | Mod: MA

## 2023-03-04 PROCEDURE — 99284 EMERGENCY DEPT VISIT MOD MDM: CPT

## 2023-03-04 PROCEDURE — 36415 COLL VENOUS BLD VENIPUNCTURE: CPT

## 2023-03-04 PROCEDURE — 85025 COMPLETE CBC W/AUTO DIFF WBC: CPT

## 2023-03-04 PROCEDURE — 81003 URINALYSIS AUTO W/O SCOPE: CPT

## 2023-03-04 PROCEDURE — 87086 URINE CULTURE/COLONY COUNT: CPT

## 2023-03-04 PROCEDURE — 80053 COMPREHEN METABOLIC PANEL: CPT

## 2023-03-04 RX ORDER — KETOROLAC TROMETHAMINE 30 MG/ML
15 SYRINGE (ML) INJECTION ONCE
Refills: 0 | Status: DISCONTINUED | OUTPATIENT
Start: 2023-03-04 | End: 2023-03-04

## 2023-03-04 RX ORDER — SODIUM CHLORIDE 9 MG/ML
1000 INJECTION INTRAMUSCULAR; INTRAVENOUS; SUBCUTANEOUS ONCE
Refills: 0 | Status: COMPLETED | OUTPATIENT
Start: 2023-03-04 | End: 2023-03-04

## 2023-03-04 RX ADMIN — SODIUM CHLORIDE 1000 MILLILITER(S): 9 INJECTION INTRAMUSCULAR; INTRAVENOUS; SUBCUTANEOUS at 14:04

## 2023-03-04 RX ADMIN — Medication 15 MILLIGRAM(S): at 14:05

## 2023-03-04 NOTE — ED PROVIDER NOTE - PATIENT PORTAL LINK FT
You can access the FollowMyHealth Patient Portal offered by [facility] by registering at the following website: [email or web address]. By joining [facility]’s FollowMyHealth portal, you will also be able to view your health information using other applications (apps) compatible with our system.

## 2023-03-04 NOTE — ED PROVIDER NOTE - ATTENDING APP SHARED VISIT CONTRIBUTION OF CARE
I personally evaluated the patient. I reviewed the Resident´s or Physician Assistant´s note (as assigned above), and agree with the findings and plan except as documented in my note.    47-year-old female presents the emergency department complaining of right low back pain.  Pain is worse with movement, worse with palpation, recently seen and treated for urinary tract infection as an outpatient by urgent care with initiation of antibiotics that led to a rash and is slowly resolving.  No other symptoms denies persistence of urinary symptoms.  No falls or injuries.  No neurologic symptoms no vomiting.    The review of systems is otherwise unremarkable    GENERAL: female in no distress.   HEENT: EOMI non icteric   CHEST: normal work of breathing noted.   CV: pulses intact S1S2 regular  ABD: soft, non rigid, non distended  EXTR: FROM   Back: Right lumbosacral palpable tenderness to palpation without midline tenderness step-offs or deformities  NEURO: AAO 3 no focal deficits  SKIN: normal no pallor  PSYCH: normal mood & mentation    Impression: Low back pain unlikely pyelonephritis    Plan: IV, labs, imaging, supportive care & reevaluation

## 2023-03-04 NOTE — ED PROVIDER NOTE - NSPTACCESSSVCSAPPT_ED_ALL_ED
SUBJECTIVE:  Charley Dow is a 70 year old female who presents to the clinic today for a rash.  Onset of rash was 5 day(s) ago.   Rash is still present.  Location of the rash: hands, wrists, arm, around the waist.  Quality/symptoms of rash: itching, burning and redness   Symptoms are mild and moderate and rash seems to be worsening.  Previous history of a similar rash? No  Recent exposure history: none known  Recent new medications: none  Associated symptoms include: rash, itching.    Past Medical History:   Diagnosis Date     Herpes zoster without mention of complication      HNP (herniated nucleus pulposus)     L4-L5     Infectious mononucleosis      Lymphocytic colitis 2011    ? from simvastatin     Other and unspecified hyperlipidemia      Raynaud's syndrome         Allergies   Allergen Reactions     Diflucan [Fluconazole] Rash     Itching, rash from neck to trunk of body Dominique Noel LPN       Ezetimibe      muscle  cramps     Simvastatin      microcytic colitis     Social History   Substance Use Topics     Smoking status: Never Smoker     Smokeless tobacco: Never Used      Comment: Socially in college     Alcohol use Yes      Comment: Rarely       ROS:  CONSTITUTIONAL:NEGATIVE for fever, chills, change in weight  INTEGUMENTARY/SKIN: POSITIVE for rash on hands, wrist, arms and around the waist  ENT/MOUTH: NEGATIVE for ear, mouth and throat problems  RESP:NEGATIVE for significant cough or SOB  CV: NEGATIVE for chest pain, palpitations or peripheral edema  GI: NEGATIVE for nausea, abdominal pain, heartburn, or change in bowel habits  MUSCULOSKELETAL: NEGATIVE for significant arthralgias or myalgia  NEURO: NEGATIVE for weakness, dizziness or paresthesias    EXAM:   /76 (BP Location: Left arm, Patient Position: Chair, Cuff Size: Adult Regular)  Pulse 63  Temp 97.9  F (36.6  C) (Oral)  Wt 177 lb (80.3 kg)  SpO2 99%  BMI 26.14 kg/m2  GENERAL: alert, no acute distress.  SKIN: Rash description:     Distribution: localized  Location: hands, wrist, upper arms, groin and around the waist    Color: red,  Lesion type: maculopapular, scattered discrete lesions with inflammation and excoriation  GENERAL APPEARANCE: healthy, alert and no distress  EYES: EOMI,  PERRL, conjunctiva clear  NECK: supple, non-tender to palpation, no adenopathy noted  RESP: lungs clear to auscultation - no rales, rhonchi or wheezes  CV: regular rates and rhythm, normal S1 S2, no murmur noted  NEURO: Normal strength and tone, sensory exam grossly normal,  normal speech and mentation    ASSESSMENT/PLAN:      ICD-10-CM    1. Insect bite, initial encounter W57.XXXA cetirizine (ZYRTEC) 10 MG tablet     permethrin (ELIMITE) 5 % cream   2. Itching L29.9 triamcinolone (KENALOG) 0.5 % cream     cetirizine (ZYRTEC) 10 MG tablet     permethrin (ELIMITE) 5 % cream   3. Scabies B86 permethrin (ELIMITE) 5 % cream         Treating for possible scabies and bug bites  Rash is nons-specific    1) See today's orders.  2) Follow-up with primary clinic if not improving     Specialty Care (Immediate)...

## 2023-03-04 NOTE — ED ADULT NURSE NOTE - CHIEF COMPLAINT QUOTE
Pt complaining right flank pain and sob x1 month. Pt denies fall, chest pain, fever. Pt states she was recently treated for UTI. Pt denies urinary symptoms today

## 2023-03-04 NOTE — ED ADULT NURSE NOTE - NSIMPLEMENTINTERV_GEN_ALL_ED
Implemented All Universal Safety Interventions:  [place] to call system. Call bell, personal items and telephone within reach. Instruct patient to call for assistance. Room bathroom lighting operational. Non-slip footwear when patient is off stretcher. Physically safe environment: no spills, clutter or unnecessary equipment. Stretcher in lowest position, wheels locked, appropriate side rails in place.

## 2023-03-04 NOTE — ED ADULT NURSE NOTE - NS ED NOTE ABUSE SUSPICION NEGLECT YN
Pt refused bedtime insulin, citing previous night's hypoglycemic episode as reason. Accucheck  263(9 units per sliding scale required). No

## 2023-03-04 NOTE — ED PROVIDER NOTE - PHYSICAL EXAMINATION
CONSTITUTIONAL: Well-appearing; well-nourished; in no apparent distress.   EYES: PERRL; EOM intact.   ENT: normal nose; no rhinorrhea; normal pharynx with no tonsillar hypertrophy.   NECK: Supple; non-tender; no cervical lymphadenopathy.   CARDIOVASCULAR: Normal S1, S2; no murmurs, rubs, or gallops. Equal radial pulses  RESPIRATORY: Normal chest excursion with respiration; breath sounds clear and equal bilaterally; no wheezes, rhonchi, or rales.  GI/: Normal bowel sounds; non-distended; non-tender; no palpable organomegaly. + rt sided cva ttp  MS: No evidence of trauma or deformity. Normal ROM in all four extremities; non-tender to palpation; distal pulses are normal.   SKIN: Normal for age and race; warm; dry; good turgor; no apparent lesions or exudate.   NEURO/PSYCH: A & O x 4; grossly unremarkable. mood and manner are appropriate.

## 2023-03-04 NOTE — ED PROVIDER NOTE - OBJECTIVE STATEMENT
47-year-old female history of cholecystectomy, hypothyroid, asthma well-controlled complaining of mild right-sided flank pain.  Pain is sharp, intermittent and not affected by position/movement.  Patient  was seen at urgent care 2 weeks ago was having dysuria/frequency/urgency x1 week and was placed on Macrobid.  States was on Macrobid x3 days and started to get reaction (body rash) Patient Was given Keflex by urgent care  took for 3 days and discontinued it because rash had not improved.   had fever 2 weeks ago with Tmax 100.2.  + nausea no vomiting, alterations in bowel habits, lower abdominal pain, history of kidney stones currently denies any urinary symptoms, vaginal discharge, chest pain, sob.

## 2023-03-04 NOTE — ED ADULT TRIAGE NOTE - CHIEF COMPLAINT QUOTE
Pt complaining right back pain and sob x1 month. Pt denies fall, chest pain, fever. Pt states she was recently treated for UTI. Pt denies urinary symptoms today Pt complaining right flank pain and sob x1 month. Pt denies fall, chest pain, fever. Pt states she was recently treated for UTI. Pt denies urinary symptoms today

## 2023-03-04 NOTE — ED PROVIDER NOTE - CLINICAL SUMMARY MEDICAL DECISION MAKING FREE TEXT BOX
47-year-old female presents to the emergency department for low back pain.  In the emergency room did screening exam, imaging and medications, with improvement in the ED during treatment.  Will discharge with outpatient management and return and follow-up instructions.  No acute emergent findings on work-up in ED, likely musculoskeletal in origin

## 2023-03-05 LAB
CULTURE RESULTS: SIGNIFICANT CHANGE UP
SPECIMEN SOURCE: SIGNIFICANT CHANGE UP

## 2023-08-09 ENCOUNTER — APPOINTMENT (OUTPATIENT)
Dept: CARDIOLOGY | Facility: CLINIC | Age: 48
End: 2023-08-09
Payer: COMMERCIAL

## 2023-08-09 VITALS — DIASTOLIC BLOOD PRESSURE: 70 MMHG | SYSTOLIC BLOOD PRESSURE: 134 MMHG | HEART RATE: 90 BPM

## 2023-08-09 VITALS — HEIGHT: 67 IN | BODY MASS INDEX: 38.61 KG/M2 | WEIGHT: 246 LBS

## 2023-08-09 DIAGNOSIS — Z86.39 PERSONAL HISTORY OF OTHER ENDOCRINE, NUTRITIONAL AND METABOLIC DISEASE: ICD-10-CM

## 2023-08-09 DIAGNOSIS — E03.9 HYPOTHYROIDISM, UNSPECIFIED: ICD-10-CM

## 2023-08-09 DIAGNOSIS — R06.02 SHORTNESS OF BREATH: ICD-10-CM

## 2023-08-09 DIAGNOSIS — F17.200 NICOTINE DEPENDENCE, UNSPECIFIED, UNCOMPLICATED: ICD-10-CM

## 2023-08-09 DIAGNOSIS — Z00.00 ENCOUNTER FOR GENERAL ADULT MEDICAL EXAMINATION W/OUT ABNORMAL FINDINGS: ICD-10-CM

## 2023-08-09 DIAGNOSIS — I20.9 ANGINA PECTORIS, UNSPECIFIED: ICD-10-CM

## 2023-08-09 DIAGNOSIS — E66.9 OBESITY, UNSPECIFIED: ICD-10-CM

## 2023-08-09 DIAGNOSIS — R00.2 PALPITATIONS: ICD-10-CM

## 2023-08-09 PROCEDURE — 99204 OFFICE O/P NEW MOD 45 MIN: CPT | Mod: 25

## 2023-08-09 PROCEDURE — 93000 ELECTROCARDIOGRAM COMPLETE: CPT

## 2023-08-09 RX ORDER — METOPROLOL TARTRATE 100 MG/1
100 TABLET, FILM COATED ORAL
Qty: 2 | Refills: 0 | Status: ACTIVE | COMMUNITY
Start: 2023-08-09 | End: 1900-01-01

## 2023-08-09 RX ORDER — ESOMEPRAZOLE MAGNESIUM 40 MG/1
40 CAPSULE, DELAYED RELEASE ORAL
Refills: 0 | Status: ACTIVE | COMMUNITY

## 2023-08-09 RX ORDER — LEVOTHYROXINE SODIUM 200 UG/1
200 TABLET ORAL
Refills: 0 | Status: ACTIVE | COMMUNITY

## 2023-08-09 NOTE — HISTORY OF PRESENT ILLNESS
[FreeTextEntry1] : pt with dilated LAE, Hypothyroid, ASTHMA significant family h/o CAD  VAPE SMOKER  pt says asthma under control. pt says went to ED 2 years ago for issues with breathing and ekg was ok in past, pt found to have ashtma and stressed.  pt has intermittent chest pressure once in awhile sometimes longer lasting than others and has dealt with it, pt was told nothing was wrong. pt says feels like elephant on chest  pt TSH in may 24 and synthroid adjusted  potassium elevated at that time   ekg: nsr with low voltage lead III

## 2023-08-09 NOTE — PHYSICAL EXAM
[Normal Venous Pressure] : normal venous pressure [Normal S1, S2] : normal S1, S2 [Clear Lung Fields] : clear lung fields [Soft] : abdomen soft [No Edema] : no edema [de-identified] : rrr

## 2023-08-09 NOTE — DISCUSSION/SUMMARY
[FreeTextEntry1] : pt with risk factors family h/o  get bloodwork  get CTA  get 2 d echo  if recurrent palpitations will do monitor in future.  [EKG obtained to assist in diagnosis and management of assessed problem(s)] : EKG obtained to assist in diagnosis and management of assessed problem(s)

## 2023-08-11 ENCOUNTER — APPOINTMENT (OUTPATIENT)
Dept: CARDIOLOGY | Facility: CLINIC | Age: 48
End: 2023-08-11
Payer: COMMERCIAL

## 2023-08-11 PROCEDURE — 93306 TTE W/DOPPLER COMPLETE: CPT

## 2023-10-05 ENCOUNTER — OUTPATIENT (OUTPATIENT)
Dept: OUTPATIENT SERVICES | Facility: HOSPITAL | Age: 48
LOS: 1 days | End: 2023-10-05
Payer: COMMERCIAL

## 2023-10-05 ENCOUNTER — RESULT REVIEW (OUTPATIENT)
Age: 48
End: 2023-10-05

## 2023-10-05 DIAGNOSIS — Z98.51 TUBAL LIGATION STATUS: Chronic | ICD-10-CM

## 2023-10-05 DIAGNOSIS — R06.02 SHORTNESS OF BREATH: ICD-10-CM

## 2023-10-05 PROCEDURE — 75574 CT ANGIO HRT W/3D IMAGE: CPT

## 2023-10-05 PROCEDURE — 75574 CT ANGIO HRT W/3D IMAGE: CPT | Mod: 26

## 2023-10-06 DIAGNOSIS — R06.02 SHORTNESS OF BREATH: ICD-10-CM
